# Patient Record
Sex: MALE | Race: WHITE | Employment: OTHER | URBAN - METROPOLITAN AREA
[De-identification: names, ages, dates, MRNs, and addresses within clinical notes are randomized per-mention and may not be internally consistent; named-entity substitution may affect disease eponyms.]

---

## 2017-12-19 ENCOUNTER — OFFICE VISIT (OUTPATIENT)
Dept: FAMILY MEDICINE CLINIC | Facility: CLINIC | Age: 62
End: 2017-12-19

## 2017-12-19 VITALS
BODY MASS INDEX: 29.4 KG/M2 | HEIGHT: 77 IN | WEIGHT: 249 LBS | RESPIRATION RATE: 12 BRPM | DIASTOLIC BLOOD PRESSURE: 58 MMHG | SYSTOLIC BLOOD PRESSURE: 110 MMHG | TEMPERATURE: 98 F | HEART RATE: 56 BPM

## 2017-12-19 DIAGNOSIS — Z13.228 SCREENING FOR ENDOCRINE, METABOLIC AND IMMUNITY DISORDER: ICD-10-CM

## 2017-12-19 DIAGNOSIS — Z13.29 SCREENING FOR ENDOCRINE, METABOLIC AND IMMUNITY DISORDER: ICD-10-CM

## 2017-12-19 DIAGNOSIS — Z13.0 SCREENING FOR ENDOCRINE, METABOLIC AND IMMUNITY DISORDER: ICD-10-CM

## 2017-12-19 DIAGNOSIS — Z00.00 ANNUAL PHYSICAL EXAM: Primary | ICD-10-CM

## 2017-12-19 DIAGNOSIS — Z13.0 SCREENING FOR IRON DEFICIENCY ANEMIA: ICD-10-CM

## 2017-12-19 DIAGNOSIS — Z12.5 SCREENING FOR PROSTATE CANCER: ICD-10-CM

## 2017-12-19 DIAGNOSIS — Z13.220 SCREENING FOR LIPOID DISORDERS: ICD-10-CM

## 2017-12-19 PROCEDURE — 90632 HEPA VACCINE ADULT IM: CPT | Performed by: FAMILY MEDICINE

## 2017-12-19 PROCEDURE — 99396 PREV VISIT EST AGE 40-64: CPT | Performed by: FAMILY MEDICINE

## 2017-12-19 PROCEDURE — 90471 IMMUNIZATION ADMIN: CPT | Performed by: FAMILY MEDICINE

## 2017-12-19 NOTE — PROGRESS NOTES
Jonah Patel is a 65year old male who presents for a complete physical exam.   HPI:   Pt complains of nothing specific. Dad had an AMI in his late 52's. Ultrafast heart scan 5-6 years ago that was \"good\".   Patient has been working on diet and exer stream  MUSCULOSKELETAL: denies back pain  NEURO: denies headaches  PSYCHE: denies depression or anxiety  HEMATOLOGIC: denies hx of anemia  ENDOCRINE: denies thyroid history  ALL/ASTHMA: denies hx of allergy or asthma    EXAM:   /58   Pulse 56   Temp

## 2018-04-25 ENCOUNTER — OFFICE VISIT (OUTPATIENT)
Dept: FAMILY MEDICINE CLINIC | Facility: CLINIC | Age: 63
End: 2018-04-25

## 2018-04-25 ENCOUNTER — HOSPITAL ENCOUNTER (OUTPATIENT)
Dept: GENERAL RADIOLOGY | Age: 63
Discharge: HOME | End: 2018-04-25
Attending: FAMILY MEDICINE
Payer: COMMERCIAL

## 2018-04-25 VITALS
TEMPERATURE: 97 F | DIASTOLIC BLOOD PRESSURE: 68 MMHG | RESPIRATION RATE: 14 BRPM | SYSTOLIC BLOOD PRESSURE: 110 MMHG | HEART RATE: 68 BPM | WEIGHT: 247 LBS | BODY MASS INDEX: 29.16 KG/M2 | HEIGHT: 77 IN

## 2018-04-25 DIAGNOSIS — G89.29 CHRONIC PAIN OF RIGHT KNEE: ICD-10-CM

## 2018-04-25 DIAGNOSIS — Z23 NEED FOR VACCINATION: ICD-10-CM

## 2018-04-25 DIAGNOSIS — M25.561 CHRONIC PAIN OF RIGHT KNEE: Primary | ICD-10-CM

## 2018-04-25 DIAGNOSIS — H00.015 HORDEOLUM EXTERNUM OF LEFT LOWER EYELID: ICD-10-CM

## 2018-04-25 DIAGNOSIS — M25.561 CHRONIC PAIN OF RIGHT KNEE: ICD-10-CM

## 2018-04-25 DIAGNOSIS — G89.29 CHRONIC PAIN OF RIGHT KNEE: Primary | ICD-10-CM

## 2018-04-25 PROCEDURE — 73560 X-RAY EXAM OF KNEE 1 OR 2: CPT | Performed by: FAMILY MEDICINE

## 2018-04-25 PROCEDURE — 99214 OFFICE O/P EST MOD 30 MIN: CPT | Performed by: FAMILY MEDICINE

## 2018-04-25 PROCEDURE — 90750 HZV VACC RECOMBINANT IM: CPT | Performed by: FAMILY MEDICINE

## 2018-04-25 PROCEDURE — 90471 IMMUNIZATION ADMIN: CPT | Performed by: FAMILY MEDICINE

## 2018-04-25 RX ORDER — ERYTHROMYCIN 5 MG/G
OINTMENT OPHTHALMIC
Qty: 1 TUBE | Refills: 0 | Status: SHIPPED | OUTPATIENT
Start: 2018-04-25 | End: 2020-01-28

## 2018-04-25 NOTE — PROGRESS NOTES
[facility] Office Note  Chief Complaint:   Patient presents with:  Pain: pain right knee, injury in Jan 2018      HPI:   This is a 59year old male coming in for right knee pain and stye of the left lower eyelid.     1.  Knee pain sclerae, + stye on left lower eyelid. Ears, Nose, Throat:  Denies hearing loss, sneezing, congestion, runny nose or sore throat. CARDIOVASCULAR:  Denies chest pain, chest pressure or chest discomfort. No palpitations or edema.   Denies any dyspnea on exert Ice and elevation  -  If no improvement with PT, will obtain MRI and refer to ortho  - XR KNEE (1 OR 2 VIEWS), RIGHT (CPT=73560); Future  - OP REFERRAL TO [facility]    2.  Need for vaccination  -  shingrix given today  -  2nd dose in 2

## 2018-07-31 ENCOUNTER — NURSE ONLY (OUTPATIENT)
Dept: FAMILY MEDICINE CLINIC | Facility: CLINIC | Age: 63
End: 2018-07-31
Payer: COMMERCIAL

## 2018-07-31 DIAGNOSIS — Z23 NEED FOR VACCINATION: Primary | ICD-10-CM

## 2018-07-31 PROCEDURE — 90471 IMMUNIZATION ADMIN: CPT | Performed by: FAMILY MEDICINE

## 2018-07-31 PROCEDURE — 90750 HZV VACC RECOMBINANT IM: CPT | Performed by: FAMILY MEDICINE

## 2018-10-18 ENCOUNTER — IMMUNIZATION (OUTPATIENT)
Dept: FAMILY MEDICINE CLINIC | Facility: CLINIC | Age: 63
End: 2018-10-18
Payer: COMMERCIAL

## 2018-10-18 DIAGNOSIS — Z23 NEED FOR VACCINATION: ICD-10-CM

## 2018-10-18 PROCEDURE — 90471 IMMUNIZATION ADMIN: CPT | Performed by: FAMILY MEDICINE

## 2018-10-18 PROCEDURE — 90686 IIV4 VACC NO PRSV 0.5 ML IM: CPT | Performed by: FAMILY MEDICINE

## 2019-09-03 ENCOUNTER — OFFICE VISIT (OUTPATIENT)
Dept: FAMILY MEDICINE CLINIC | Facility: CLINIC | Age: 64
End: 2019-09-03
Payer: COMMERCIAL

## 2019-09-03 VITALS
HEIGHT: 76.5 IN | WEIGHT: 261 LBS | DIASTOLIC BLOOD PRESSURE: 74 MMHG | SYSTOLIC BLOOD PRESSURE: 116 MMHG | BODY MASS INDEX: 31.46 KG/M2 | HEART RATE: 56 BPM | TEMPERATURE: 98 F | RESPIRATION RATE: 12 BRPM

## 2019-09-03 DIAGNOSIS — Z82.49 FAMILY HISTORY OF HEART DISEASE IN MALE FAMILY MEMBER BEFORE AGE 55: ICD-10-CM

## 2019-09-03 DIAGNOSIS — Z00.00 ANNUAL PHYSICAL EXAM: Primary | ICD-10-CM

## 2019-09-03 DIAGNOSIS — Z13.220 SCREENING FOR LIPOID DISORDERS: ICD-10-CM

## 2019-09-03 DIAGNOSIS — Z13.29 SCREENING FOR ENDOCRINE, METABOLIC AND IMMUNITY DISORDER: ICD-10-CM

## 2019-09-03 DIAGNOSIS — Z13.0 SCREENING FOR IRON DEFICIENCY ANEMIA: ICD-10-CM

## 2019-09-03 DIAGNOSIS — Z13.228 SCREENING FOR ENDOCRINE, METABOLIC AND IMMUNITY DISORDER: ICD-10-CM

## 2019-09-03 DIAGNOSIS — Z13.0 SCREENING FOR ENDOCRINE, METABOLIC AND IMMUNITY DISORDER: ICD-10-CM

## 2019-09-03 DIAGNOSIS — Z13.89 SCREENING FOR GENITOURINARY CONDITION: ICD-10-CM

## 2019-09-03 DIAGNOSIS — Z12.5 SCREENING FOR PROSTATE CANCER: ICD-10-CM

## 2019-09-03 LAB
APPEARANCE: CLEAR
MULTISTIX LOT#: NORMAL NUMERIC
PH, URINE: 5 (ref 4.5–8)
SPECIFIC GRAVITY: 1.02 (ref 1–1.03)
URINE-COLOR: YELLOW
UROBILINOGEN,SEMI-QN: 0.2 MG/DL (ref 0–1.9)

## 2019-09-03 PROCEDURE — 81003 URINALYSIS AUTO W/O SCOPE: CPT | Performed by: FAMILY MEDICINE

## 2019-09-03 PROCEDURE — 99396 PREV VISIT EST AGE 40-64: CPT | Performed by: FAMILY MEDICINE

## 2019-09-03 NOTE — PROGRESS NOTES
George Narayan is a 62year old male who presents for a complete physical exam.   HPI:   Pt complains of nothing specific. Dad had an AMI in his late 52's. Ultrafast heart scan 5-6 years ago that was \"good\". Will have colonoscopy in 1/20.     Wt Deep [place] denies depression or anxiety  HEMATOLOGIC: denies hx of anemia  ENDOCRINE: denies thyroid history  ALL/ASTHMA: denies hx of allergy or asthma    EXAM:   /74   Pulse 56   Temp 98 °F (36.7 °C)   Resp 12   Ht 76.5\"   Wt 261 lb   BMI 31.36 kg/m²   Body

## 2019-09-04 ENCOUNTER — MED REC SCAN ONLY (OUTPATIENT)
Dept: FAMILY MEDICINE CLINIC | Facility: CLINIC | Age: 64
End: 2019-09-04

## 2019-10-15 ENCOUNTER — HOSPITAL ENCOUNTER (OUTPATIENT)
Dept: CT IMAGING | Facility: HOSPITAL | Age: 64
Discharge: HOME | End: 2019-10-15
Attending: FAMILY MEDICINE

## 2019-10-15 DIAGNOSIS — Z13.6 SCREENING FOR CARDIOVASCULAR CONDITION: ICD-10-CM

## 2019-10-25 ENCOUNTER — TELEPHONE (OUTPATIENT)
Dept: FAMILY MEDICINE CLINIC | Facility: CLINIC | Age: 64
End: 2019-10-25

## 2019-10-25 DIAGNOSIS — I77.810 DILATATION OF THORACIC AORTA (HCC): Primary | ICD-10-CM

## 2019-10-25 NOTE — TELEPHONE ENCOUNTER
LM to Cb. Please inform pt he had an UFHS with an incidental finding of a dilated thoracic aorta of 4.0-4.1 cm. Dr. Ty López would like him to have a gated CT angiogram of the thoracic aorta for further evaluation. Order not placed until pt is notified.

## 2019-10-25 NOTE — TELEPHONE ENCOUNTER
Pt informed of [facility] result done 10/15/2019 and the recommendation. Pt voiced understanding, but request to talk Dr. Pepe Sommer. Informed pt I will send request to Dr. Pepe Sommer, but may not be able to call him back today because he is seeing patients.

## 2019-10-25 NOTE — TELEPHONE ENCOUNTER
I spoke with pt. He is aware he can wait and do his gated CTA of the thoracic aorta in January per Dr. Govea Artist. Pt agreed to plan and verbalized understanding.

## 2019-10-31 ENCOUNTER — IMMUNIZATION (OUTPATIENT)
Dept: FAMILY MEDICINE CLINIC | Facility: CLINIC | Age: 64
End: 2019-10-31
Payer: COMMERCIAL

## 2019-10-31 DIAGNOSIS — Z23 NEED FOR VACCINATION: ICD-10-CM

## 2019-10-31 PROCEDURE — 90471 IMMUNIZATION ADMIN: CPT | Performed by: FAMILY MEDICINE

## 2019-10-31 PROCEDURE — 90686 IIV4 VACC NO PRSV 0.5 ML IM: CPT | Performed by: FAMILY MEDICINE

## 2019-11-21 ENCOUNTER — TELEPHONE (OUTPATIENT)
Dept: FAMILY MEDICINE CLINIC | Facility: CLINIC | Age: 64
End: 2019-11-21

## 2019-11-26 ENCOUNTER — MED REC SCAN ONLY (OUTPATIENT)
Dept: FAMILY MEDICINE CLINIC | Facility: CLINIC | Age: 64
End: 2019-11-26

## 2020-01-29 PROBLEM — Z12.11 SPECIAL SCREENING FOR MALIGNANT NEOPLASM OF COLON: Status: ACTIVE | Noted: 2020-01-29

## 2020-01-31 ENCOUNTER — HOSPITAL ENCOUNTER (OUTPATIENT)
Dept: CT IMAGING | Facility: HOSPITAL | Age: 65
Discharge: HOME | End: 2020-01-31
Attending: FAMILY MEDICINE
Payer: COMMERCIAL

## 2020-01-31 VITALS — SYSTOLIC BLOOD PRESSURE: 129 MMHG | DIASTOLIC BLOOD PRESSURE: 80 MMHG | RESPIRATION RATE: 16 BRPM | HEART RATE: 57 BPM

## 2020-01-31 DIAGNOSIS — I77.810 DILATATION OF THORACIC AORTA (HCC): ICD-10-CM

## 2020-01-31 LAB — CREAT BLD-MCNC: 1.1 MG/DL (ref 0.7–1.3)

## 2020-01-31 PROCEDURE — 82565 ASSAY OF CREATININE: CPT

## 2020-01-31 PROCEDURE — 71275 CT ANGIOGRAPHY CHEST: CPT | Performed by: FAMILY MEDICINE

## 2020-01-31 NOTE — IMAGING NOTE
Explained Gated  CTA  thoracic aorta procedure. Patient asymptomatic, denies CP distress. Patient kept comfortable in CT table. Noted SB with infrequent PVC'c in lead II on heart monitor. Patient tolerated procedure well. Average heart rate = 50 .   Denies

## 2021-04-29 ENCOUNTER — MED REC SCAN ONLY (OUTPATIENT)
Dept: FAMILY MEDICINE CLINIC | Facility: CLINIC | Age: 66
End: 2021-04-29

## 2021-04-29 ENCOUNTER — OFFICE VISIT (OUTPATIENT)
Dept: FAMILY MEDICINE CLINIC | Facility: CLINIC | Age: 66
End: 2021-04-29
Payer: MEDICARE

## 2021-04-29 VITALS
RESPIRATION RATE: 16 BRPM | HEIGHT: 77 IN | DIASTOLIC BLOOD PRESSURE: 70 MMHG | BODY MASS INDEX: 31.41 KG/M2 | SYSTOLIC BLOOD PRESSURE: 120 MMHG | HEART RATE: 60 BPM | TEMPERATURE: 99 F | WEIGHT: 266 LBS

## 2021-04-29 DIAGNOSIS — Z00.00 ENCOUNTER FOR ANNUAL HEALTH EXAMINATION: Primary | ICD-10-CM

## 2021-04-29 DIAGNOSIS — Z13.89 SCREENING FOR GENITOURINARY CONDITION: ICD-10-CM

## 2021-04-29 DIAGNOSIS — Z23 NEED FOR VACCINATION: ICD-10-CM

## 2021-04-29 DIAGNOSIS — Z13.0 SCREENING FOR ENDOCRINE, METABOLIC AND IMMUNITY DISORDER: ICD-10-CM

## 2021-04-29 DIAGNOSIS — Z13.228 SCREENING FOR ENDOCRINE, METABOLIC AND IMMUNITY DISORDER: ICD-10-CM

## 2021-04-29 DIAGNOSIS — Z13.220 SCREENING FOR LIPOID DISORDERS: ICD-10-CM

## 2021-04-29 DIAGNOSIS — Z13.29 SCREENING FOR ENDOCRINE, METABOLIC AND IMMUNITY DISORDER: ICD-10-CM

## 2021-04-29 DIAGNOSIS — Z11.59 NEED FOR HEPATITIS C SCREENING TEST: ICD-10-CM

## 2021-04-29 DIAGNOSIS — Z12.5 SCREENING FOR PROSTATE CANCER: ICD-10-CM

## 2021-04-29 DIAGNOSIS — Z13.0 SCREENING FOR IRON DEFICIENCY ANEMIA: ICD-10-CM

## 2021-04-29 PROCEDURE — 99397 PER PM REEVAL EST PAT 65+ YR: CPT | Performed by: FAMILY MEDICINE

## 2021-04-29 PROCEDURE — 3074F SYST BP LT 130 MM HG: CPT | Performed by: FAMILY MEDICINE

## 2021-04-29 PROCEDURE — 3078F DIAST BP <80 MM HG: CPT | Performed by: FAMILY MEDICINE

## 2021-04-29 PROCEDURE — G0402 INITIAL PREVENTIVE EXAM: HCPCS | Performed by: FAMILY MEDICINE

## 2021-04-29 PROCEDURE — 96160 PT-FOCUSED HLTH RISK ASSMT: CPT | Performed by: FAMILY MEDICINE

## 2021-04-29 PROCEDURE — 3008F BODY MASS INDEX DOCD: CPT | Performed by: FAMILY MEDICINE

## 2021-04-29 NOTE — PATIENT INSTRUCTIONS
Dhaval Leon's SCREENING SCHEDULE   Tests on this list are recommended by your physician but may not be covered, or covered at this frequency, by your insurer. Please check with your insurance carrier before scheduling to verify coverage.     PREVENT abnormal Colonoscopy due on 01/29/2030 Update [facility] if applicable    Flex Sigmoidoscopy Screen  Covered every 5 years No results found for this or any previous visit. No flowsheet data found.      Fecal Occult Blood   Covered Annually No result >7 YEARS, IM USE    This may be covered with your prescription benefits, but Medicare does not cover unless Medically needed    Zoster (Not covered by Medicare Part B) No orders found for this or any previous visit.  This may be covered with your pharmacy Medicare covers annually or at 6-month intervals for prediabetic patients        Cardiovascular Disease Screening     Cholesterol, covered every 5 yrs including Total, LDL and Trigs LDL-CHOLESTEROL (mg/dL (calc))   Date Value   10/09/2019 78     SHANNAN Influenza  Covered Annually No orders found for this or any previous visit. Please get every year    Pneumococcal 13 (Prevnar)  Covered Once after 65 No orders found for this or any previous visit.  Please get once after your 65th birthday    Pneumococcal 2 Association regarding Advance Directives.

## 2021-04-29 NOTE — PROGRESS NOTES
HPI:   Jonah Patel is a 73year old male who presents for a MA (Medicare Advantage) Supervisit (Once per calendar year). Pt complains of nothing specific. Dad had an AMI in his late 52's.   Patient had an ultrafast heart scan on 10/15/2019 with a 0 physical exam     Special screening for malignant neoplasm of colon    Wt Readings from Last 3 Encounters:  04/29/21 : 266 lb (120.7 kg)  01/28/20 : 260 lb (117.9 kg)  09/03/19 : 261 lb (118.4 kg)     Last Cholesterol Labs:   Lab Results   Component Value 60   Temp 98.7 °F (37.1 °C)   Resp 16   Ht 6' 5\" (1.956 m)   Wt 266 lb (120.7 kg)   BMI 31.54 kg/m²   Estimated body mass index is 31.54 kg/m² as calculated from the following:    Height as of this encounter: 6' 5\" (1.956 m).     Weight as of this encount masses or tenderness   Extremities: Extremities normal, atraumatic, no cyanosis or edema   Pulses: 2+ and symmetric   Skin: Skin color, texture, turgor normal, no rashes or lesions   Lymph nodes: Cervical, supraclavicular, and axillary nodes normal   Neuro appetite been poor?: No  How does the patient maintain a good energy level?: Daily Walks  How would you describe your daily physical activity?: Light  How would you describe your current health state?: Good  How do you maintain positive mental well-being?: Factor VIII or IX concentrates   Clients of institutions for the mentally retarded   Persons who live in the same house as a HepB virus carrier   Homosexual men   Illicit injectable drug abusers     Tetanus Toxoid  Only covered with a cut with metal- TD an

## 2021-05-21 ENCOUNTER — NURSE ONLY (OUTPATIENT)
Dept: FAMILY MEDICINE CLINIC | Facility: CLINIC | Age: 66
End: 2021-05-21
Payer: MEDICARE

## 2021-05-21 PROCEDURE — G0009 ADMIN PNEUMOCOCCAL VACCINE: HCPCS | Performed by: FAMILY MEDICINE

## 2021-05-21 PROCEDURE — 90732 PPSV23 VACC 2 YRS+ SUBQ/IM: CPT | Performed by: FAMILY MEDICINE

## 2021-10-18 ENCOUNTER — PATIENT MESSAGE (OUTPATIENT)
Dept: FAMILY MEDICINE CLINIC | Facility: CLINIC | Age: 66
End: 2021-10-18

## 2021-10-19 NOTE — TELEPHONE ENCOUNTER
From: Micah Christine  To: Bobbi De MD  Sent: 10/18/2021 10:51 AM CDT  Subject: Jeff Wray Booster Shot    Please have entered into [employer] that I had my Bomgar Third Shot Booster done on 10/18/2021.

## 2022-05-05 ENCOUNTER — OFFICE VISIT (OUTPATIENT)
Dept: FAMILY MEDICINE CLINIC | Facility: CLINIC | Age: 67
End: 2022-05-05
Payer: MEDICARE

## 2022-05-05 VITALS
DIASTOLIC BLOOD PRESSURE: 84 MMHG | HEART RATE: 56 BPM | RESPIRATION RATE: 16 BRPM | TEMPERATURE: 98 F | HEIGHT: 76.5 IN | SYSTOLIC BLOOD PRESSURE: 130 MMHG | BODY MASS INDEX: 31.94 KG/M2 | WEIGHT: 265 LBS

## 2022-05-05 DIAGNOSIS — N40.0 BENIGN PROSTATIC HYPERPLASIA WITHOUT LOWER URINARY TRACT SYMPTOMS: ICD-10-CM

## 2022-05-05 DIAGNOSIS — Z13.0 SCREENING FOR IRON DEFICIENCY ANEMIA: ICD-10-CM

## 2022-05-05 DIAGNOSIS — Z23 NEED FOR VACCINATION: ICD-10-CM

## 2022-05-05 DIAGNOSIS — Z13.29 SCREENING FOR ENDOCRINE, METABOLIC AND IMMUNITY DISORDER: ICD-10-CM

## 2022-05-05 DIAGNOSIS — Z13.89 SCREENING FOR GENITOURINARY CONDITION: ICD-10-CM

## 2022-05-05 DIAGNOSIS — Z13.228 SCREENING FOR ENDOCRINE, METABOLIC AND IMMUNITY DISORDER: ICD-10-CM

## 2022-05-05 DIAGNOSIS — Z00.00 ENCOUNTER FOR ANNUAL HEALTH EXAMINATION: Primary | ICD-10-CM

## 2022-05-05 DIAGNOSIS — Z13.220 SCREENING FOR LIPOID DISORDERS: ICD-10-CM

## 2022-05-05 DIAGNOSIS — Z13.0 SCREENING FOR ENDOCRINE, METABOLIC AND IMMUNITY DISORDER: ICD-10-CM

## 2022-05-05 PROCEDURE — 3079F DIAST BP 80-89 MM HG: CPT | Performed by: FAMILY MEDICINE

## 2022-05-05 PROCEDURE — 96160 PT-FOCUSED HLTH RISK ASSMT: CPT | Performed by: FAMILY MEDICINE

## 2022-05-05 PROCEDURE — G0439 PPPS, SUBSEQ VISIT: HCPCS | Performed by: FAMILY MEDICINE

## 2022-05-05 PROCEDURE — G0009 ADMIN PNEUMOCOCCAL VACCINE: HCPCS | Performed by: FAMILY MEDICINE

## 2022-05-05 PROCEDURE — 3075F SYST BP GE 130 - 139MM HG: CPT | Performed by: FAMILY MEDICINE

## 2022-05-05 PROCEDURE — 99397 PER PM REEVAL EST PAT 65+ YR: CPT | Performed by: FAMILY MEDICINE

## 2022-05-05 PROCEDURE — 3008F BODY MASS INDEX DOCD: CPT | Performed by: FAMILY MEDICINE

## 2022-05-05 PROCEDURE — 90677 PCV20 VACCINE IM: CPT | Performed by: FAMILY MEDICINE

## 2022-05-10 LAB
ABSOLUTE BASOPHILS: 59 CELLS/UL (ref 0–200)
ABSOLUTE EOSINOPHILS: 99 CELLS/UL (ref 15–500)
ABSOLUTE LYMPHOCYTES: 1710 CELLS/UL (ref 850–3900)
ABSOLUTE MONOCYTES: 558 CELLS/UL (ref 200–950)
ABSOLUTE NEUTROPHILS: 2075 CELLS/UL (ref 1500–7800)
ALBUMIN/GLOBULIN RATIO: 1.6 (CALC) (ref 1–2.5)
ALBUMIN: 3.9 G/DL (ref 3.6–5.1)
ALKALINE PHOSPHATASE: 60 U/L (ref 35–144)
ALT: 17 U/L (ref 9–46)
APPEARANCE: CLEAR
AST: 16 U/L (ref 10–35)
BASOPHILS: 1.3 %
BILIRUBIN, TOTAL: 0.4 MG/DL (ref 0.2–1.2)
BILIRUBIN: NEGATIVE
BUN: 19 MG/DL (ref 7–25)
CALCIUM: 8.9 MG/DL (ref 8.6–10.3)
CARBON DIOXIDE: 26 MMOL/L (ref 20–32)
CHLORIDE: 106 MMOL/L (ref 98–110)
CHOL/HDLC RATIO: 2.6 (CALC)
CHOLESTEROL, TOTAL: 140 MG/DL
COLOR: YELLOW
CREATININE: 1.16 MG/DL (ref 0.7–1.25)
EGFR IF AFRICN AM: 76 ML/MIN/1.73M2
EGFR IF NONAFRICN AM: 65 ML/MIN/1.73M2
EOSINOPHILS: 2.2 %
GLOBULIN: 2.4 G/DL (CALC) (ref 1.9–3.7)
GLUCOSE: 95 MG/DL (ref 65–99)
GLUCOSE: NEGATIVE
HDL CHOLESTEROL: 53 MG/DL
HEMATOCRIT: 43.5 % (ref 38.5–50)
HEMOGLOBIN: 14.4 G/DL (ref 13.2–17.1)
KETONES: NEGATIVE
LDL-CHOLESTEROL: 69 MG/DL (CALC)
LEUKOCYTE ESTERASE: NEGATIVE
LYMPHOCYTES: 38 %
MCH: 30.3 PG (ref 27–33)
MCHC: 33.1 G/DL (ref 32–36)
MCV: 91.6 FL (ref 80–100)
MONOCYTES: 12.4 %
MPV: 10.4 FL (ref 7.5–12.5)
NEUTROPHILS: 46.1 %
NITRITE: NEGATIVE
NON-HDL CHOLESTEROL: 87 MG/DL (CALC)
OCCULT BLOOD: NEGATIVE
PLATELET COUNT: 208 THOUSAND/UL (ref 140–400)
POTASSIUM: 4.2 MMOL/L (ref 3.5–5.3)
PROTEIN, TOTAL: 6.3 G/DL (ref 6.1–8.1)
PROTEIN: NEGATIVE
PSA, TOTAL: 0.58 NG/ML
RDW: 14.6 % (ref 11–15)
RED BLOOD CELL COUNT: 4.75 MILLION/UL (ref 4.2–5.8)
SODIUM: 139 MMOL/L (ref 135–146)
SPECIFIC GRAVITY: 1.02 (ref 1–1.03)
TRIGLYCERIDES: 93 MG/DL
WHITE BLOOD CELL COUNT: 4.5 THOUSAND/UL (ref 3.8–10.8)

## 2022-07-18 ENCOUNTER — TELEPHONE (OUTPATIENT)
Dept: FAMILY MEDICINE CLINIC | Facility: CLINIC | Age: 67
End: 2022-07-18

## 2022-07-18 DIAGNOSIS — Z12.11 ENCOUNTER FOR SCREENING FECAL OCCULT BLOOD TESTING: Primary | ICD-10-CM

## 2022-07-18 NOTE — TELEPHONE ENCOUNTER
Pt called for order fecal occult lab tests. Pt states that his insurance company would pay for it. Pt is ok to receive a [employer] message when order is placed.

## 2022-07-26 ENCOUNTER — MED REC SCAN ONLY (OUTPATIENT)
Dept: FAMILY MEDICINE CLINIC | Facility: CLINIC | Age: 67
End: 2022-07-26

## 2022-08-08 ENCOUNTER — LAB ENCOUNTER (OUTPATIENT)
Dept: LAB | Age: 67
End: 2022-08-08
Attending: FAMILY MEDICINE
Payer: COMMERCIAL

## 2022-08-08 LAB — HEMOCCULT STL QL: NEGATIVE

## 2022-08-08 PROCEDURE — 82274 ASSAY TEST FOR BLOOD FECAL: CPT | Performed by: FAMILY MEDICINE

## 2023-08-23 ENCOUNTER — OFFICE VISIT (OUTPATIENT)
Dept: FAMILY MEDICINE CLINIC | Facility: CLINIC | Age: 68
End: 2023-08-23
Payer: MEDICARE

## 2023-08-23 VITALS
HEIGHT: 76.5 IN | RESPIRATION RATE: 16 BRPM | OXYGEN SATURATION: 94 % | DIASTOLIC BLOOD PRESSURE: 50 MMHG | WEIGHT: 250 LBS | HEART RATE: 54 BPM | SYSTOLIC BLOOD PRESSURE: 92 MMHG | BODY MASS INDEX: 30.13 KG/M2 | TEMPERATURE: 97 F

## 2023-08-23 DIAGNOSIS — Z13.220 SCREENING FOR LIPOID DISORDERS: ICD-10-CM

## 2023-08-23 DIAGNOSIS — Z13.29 SCREENING FOR ENDOCRINE, METABOLIC AND IMMUNITY DISORDER: ICD-10-CM

## 2023-08-23 DIAGNOSIS — Z00.00 ENCOUNTER FOR ANNUAL HEALTH EXAMINATION: Primary | ICD-10-CM

## 2023-08-23 DIAGNOSIS — Z13.0 SCREENING FOR IRON DEFICIENCY ANEMIA: ICD-10-CM

## 2023-08-23 DIAGNOSIS — Z13.0 SCREENING FOR ENDOCRINE, METABOLIC AND IMMUNITY DISORDER: ICD-10-CM

## 2023-08-23 DIAGNOSIS — N40.0 BENIGN PROSTATIC HYPERPLASIA WITHOUT LOWER URINARY TRACT SYMPTOMS: ICD-10-CM

## 2023-08-23 DIAGNOSIS — Z12.5 SCREENING FOR PROSTATE CANCER: ICD-10-CM

## 2023-08-23 DIAGNOSIS — Z13.228 SCREENING FOR ENDOCRINE, METABOLIC AND IMMUNITY DISORDER: ICD-10-CM

## 2023-08-25 LAB
ABSOLUTE BASOPHILS: 40 CELLS/UL (ref 0–200)
ABSOLUTE EOSINOPHILS: 70 CELLS/UL (ref 15–500)
ABSOLUTE LYMPHOCYTES: 1562 CELLS/UL (ref 850–3900)
ABSOLUTE MONOCYTES: 550 CELLS/UL (ref 200–950)
ABSOLUTE NEUTROPHILS: 2178 CELLS/UL (ref 1500–7800)
ALBUMIN/GLOBULIN RATIO: 1.5 (CALC) (ref 1–2.5)
ALBUMIN: 4 G/DL (ref 3.6–5.1)
ALKALINE PHOSPHATASE: 59 U/L (ref 35–144)
ALT: 13 U/L (ref 9–46)
AST: 13 U/L (ref 10–35)
BASOPHILS: 0.9 %
BILIRUBIN, TOTAL: 0.4 MG/DL (ref 0.2–1.2)
BUN: 19 MG/DL (ref 7–25)
CALCIUM: 9.2 MG/DL (ref 8.6–10.3)
CARBON DIOXIDE: 26 MMOL/L (ref 20–32)
CHLORIDE: 107 MMOL/L (ref 98–110)
CHOL/HDLC RATIO: 2.7 (CALC)
CHOLESTEROL, TOTAL: 145 MG/DL
CREATININE: 1.2 MG/DL (ref 0.7–1.35)
EGFR: 66 ML/MIN/1.73M2
EOSINOPHILS: 1.6 %
GLOBULIN: 2.6 G/DL (CALC) (ref 1.9–3.7)
GLUCOSE: 92 MG/DL (ref 65–99)
HDL CHOLESTEROL: 53 MG/DL
HEMATOCRIT: 47.1 % (ref 38.5–50)
HEMOGLOBIN: 15 G/DL (ref 13.2–17.1)
LDL-CHOLESTEROL: 74 MG/DL (CALC)
LYMPHOCYTES: 35.5 %
MCH: 30.9 PG (ref 27–33)
MCHC: 31.8 G/DL (ref 32–36)
MCV: 96.9 FL (ref 80–100)
MONOCYTES: 12.5 %
MPV: 11.1 FL (ref 7.5–12.5)
NEUTROPHILS: 49.5 %
NON-HDL CHOLESTEROL: 92 MG/DL (CALC)
PLATELET COUNT: 184 THOUSAND/UL (ref 140–400)
POTASSIUM: 4.2 MMOL/L (ref 3.5–5.3)
PROTEIN, TOTAL: 6.6 G/DL (ref 6.1–8.1)
PSA, TOTAL: 0.42 NG/ML
RDW: 13.5 % (ref 11–15)
RED BLOOD CELL COUNT: 4.86 MILLION/UL (ref 4.2–5.8)
SODIUM: 141 MMOL/L (ref 135–146)
TRIGLYCERIDES: 97 MG/DL
TSH W/REFLEX TO FT4: 1.95 MIU/L (ref 0.4–4.5)
WHITE BLOOD CELL COUNT: 4.4 THOUSAND/UL (ref 3.8–10.8)

## 2023-09-26 ENCOUNTER — PATIENT MESSAGE (OUTPATIENT)
Dept: FAMILY MEDICINE CLINIC | Facility: CLINIC | Age: 68
End: 2023-09-26

## 2023-09-26 NOTE — TELEPHONE ENCOUNTER
From: Sirisha How  To: Darylene CardLisseth Buck Riddles: 9/26/2023 4:21 PM CDT  Subject: Add (2) Vaccinations to My Chart    Can you please have someone add the attached vaccinations I had on 9/19/23 to My Chart. They are the updated Pfzier Covid vaccination and the High Dose Flu vaccination.     Thanks,    Nimisha Morgan  [phone] (mobile)

## 2024-07-29 ENCOUNTER — PATIENT MESSAGE (OUTPATIENT)
Dept: FAMILY MEDICINE CLINIC | Facility: CLINIC | Age: 69
End: 2024-07-29

## 2024-07-29 NOTE — TELEPHONE ENCOUNTER
From: Joey Leon  To: Carter Bowling  Sent: 7/29/2024 2:59 PM CDT  Subject: Please add a vaccination to My Chart    On 6/25/2024, I received the Pfizer high dose Covid immunization at [address]. Please add this to My Chart. Thanks.

## 2024-09-05 ENCOUNTER — OFFICE VISIT (OUTPATIENT)
Dept: FAMILY MEDICINE CLINIC | Facility: CLINIC | Age: 69
End: 2024-09-05
Payer: MEDICARE

## 2024-09-05 VITALS
TEMPERATURE: 98 F | HEART RATE: 63 BPM | DIASTOLIC BLOOD PRESSURE: 64 MMHG | WEIGHT: 250 LBS | BODY MASS INDEX: 30.13 KG/M2 | RESPIRATION RATE: 16 BRPM | HEIGHT: 76.5 IN | SYSTOLIC BLOOD PRESSURE: 118 MMHG

## 2024-09-05 DIAGNOSIS — Z13.0 SCREENING FOR IRON DEFICIENCY ANEMIA: ICD-10-CM

## 2024-09-05 DIAGNOSIS — Z00.00 MEDICARE ANNUAL WELLNESS VISIT, SUBSEQUENT: ICD-10-CM

## 2024-09-05 DIAGNOSIS — Z13.0 SCREENING FOR ENDOCRINE, METABOLIC AND IMMUNITY DISORDER: ICD-10-CM

## 2024-09-05 DIAGNOSIS — Z13.228 SCREENING FOR ENDOCRINE, METABOLIC AND IMMUNITY DISORDER: ICD-10-CM

## 2024-09-05 DIAGNOSIS — Z13.29 SCREENING FOR ENDOCRINE, METABOLIC AND IMMUNITY DISORDER: ICD-10-CM

## 2024-09-05 DIAGNOSIS — N40.0 BENIGN PROSTATIC HYPERPLASIA WITHOUT LOWER URINARY TRACT SYMPTOMS: ICD-10-CM

## 2024-09-05 DIAGNOSIS — Z00.00 ENCOUNTER FOR ANNUAL HEALTH EXAMINATION: ICD-10-CM

## 2024-09-05 DIAGNOSIS — Z13.220 SCREENING FOR LIPOID DISORDERS: ICD-10-CM

## 2024-09-05 DIAGNOSIS — Z12.5 SCREENING FOR PROSTATE CANCER: ICD-10-CM

## 2024-09-05 PROCEDURE — G0439 PPPS, SUBSEQ VISIT: HCPCS | Performed by: FAMILY MEDICINE

## 2024-09-05 PROCEDURE — 96160 PT-FOCUSED HLTH RISK ASSMT: CPT | Performed by: FAMILY MEDICINE

## 2024-09-05 NOTE — PROGRESS NOTES
Subjective:   Joey Leon is a 69 year old male who presents for a MA AHA (Medicare Advantage Annual Health Assessment) and Medicare Subsequent Annual Wellness visit (Pt already had Initial Annual Wellness) and good health with no chronic problems.   Patient denies any acute concerns today states that he has been doing well and denies any chest pain nausea vomiting diarrhea constipation.    History/Other:   Fall Risk Assessment:   He has been screened for Falls and is low risk.      Cognitive Assessment:   He had a completely normal cognitive assessment - see flowsheet entries     Functional Ability/Status:   Joey Leon has a completely normal functional assessment. See flowsheet for details.      Depression Screening (PHQ):  PHQ-2 SCORE: 0  , done 9/5/2024          Advanced Directives:   He does have a Living Will but we do NOT have it on file in Epic.    He has a Power of  for Health Care on file in [email or web address].  Patient has Advance Care Planning documents present in EMR. Reviewed documents with patient (and family/surrogate if present).      Patient Active Problem List   Diagnosis    Dizziness and giddiness    Screening for iron deficiency anemia    Screening for endocrine, metabolic and immunity disorder    Screening for lipoid disorders    Screening for prostate cancer    Annual physical exam    Special screening for malignant neoplasm of colon     Allergies:  He has No Known Allergies.    Current Medications:  No outpatient medications have been marked as taking for the 9/5/24 encounter (Office Visit) with Carter Bowling MD.       Medical History:  He  has a past medical history of Vomiting (1/8/2020) and Wears glasses (Since 1969).  Surgical History:  He  has a past surgical history that includes colonoscopy (1/13/2009).   Family History:  His family history includes Heart Attack in his father.  Social History:  He  reports that he has never smoked. He has never used smokeless tobacco. He  reports current alcohol use. He reports that he does not use drugs.    Tobacco:  He has never smoked tobacco.    CAGE Alcohol Screen:   CAGE screening score of 0 on 8/29/2024, showing low risk of alcohol abuse.      Patient Care Team:  Jill Smith MD as PCP - General    Review of Systems  Consitutional: No fevers, chills, sweats  Eye: No recent visual problems  ENMT: No ear pain nasal congestion sore throat  Respiratory: No shortness of breath, cough  Cardiovascular: No chest pain palpitations syncope  Gastrointestinal: No nausea vomiting diarrhea  Genitourinary: No hematuria  Hema/Lymph no bruising tendency, swollen lymph glands  Endocrine: Negative for excessive thirst excessive hunger  Musculoskeletal: No back pain neck pain joint pain muscle pain decreased range of motion  Integumentary: No rash, pruritus, abrasions  Neurologic: Alert and oriented x4  Psychiatric: No anxiety, depression    Medical, surgical, family, and social histories were reviewed      Objective:   Physical Exam    VITALS: Vitals reviewed   GENERAL: well developed, well nourished, in no apparent distress  SKIN: no rashes, no suspicious lesions: Cool and Dry  HEENT: atraumatic, normocephalic, ears and throat are clear bilateral tympanic membranes lucent nonbulging intact nose without bleeding purulent discharge or septal hematoma.    EYES: Pupils equal round and reactive.  Extraocular motions intact no scleral icterus no injection or drainage  THROAT without erythema tonsillar hypertrophy or exudate.  Uvula midline airway patent                Hearing Assessed via: Whispered Voice  NECK: trachea midline.  No JVD or lymphadenopathy supple nontender no meningeal signs   LUNGS: clear to auscultation sounds equal bilaterally no wheezes rales or rhonchi  CARDIO: Regular rate and rhythm without murmurs gallops or rubs  GI: Soft nontender nondistended no hepatomegaly palpable masses.  No guarding.   without deformity or crepitus  no flank tenderness  EXTREMITIES: no cyanosis, clubbing or edema no joint tenderness effusion or edema noted.  No calf tenderness negative Homans' sign bilaterally  NEURO: Awake and alert.  Cranial nerves II through XII intact motor and sensory grossly within normal limits.  5 out of 5 muscle strength in all muscle groups.  Normal speech.  PSYCHIATRIC: Awake, alert and oriented x3, cooperative appropriate mood and affect.  Judgment intact        /64 (BP Location: Left arm, Patient Position: Sitting, Cuff Size: adult)   Pulse 63   Temp 97.5 °F (36.4 °C) (Temporal)   Resp 16   Ht 6' 4.5\" (1.943 m)   Wt 250 lb (113.4 kg)   BMI 30.03 kg/m²  Estimated body mass index is 30.03 kg/m² as calculated from the following:    Height as of this encounter: 6' 4.5\" (1.943 m).    Weight as of this encounter: 250 lb (113.4 kg).    Medicare Hearing Assessment:   Hearing Screening    Time taken: 9/5/2024  7:38 AM  Entry User: Jean-Claude Donnelly MA  Screening Method: Finger Rub  Finger Rub Result: Pass         Visual Acuity:   Right Eye Visual Acuity: Corrected Right Eye Chart Acuity: 20/50   Left Eye Visual Acuity: Corrected Left Eye Chart Acuity: 20/70   Both Eyes Visual Acuity: Corrected Both Eyes Chart Acuity: 20/40   Able To Tolerate Visual Acuity: Yes        Assessment & Plan:   Joey Leon is a 69 year old male who presents for a Medicare Assessment.     1. Medicare annual wellness visit, subsequent    I did discuss with the patient would suggest updating blood work and need a fasting lipid panel CBC CMP PSA TSH T3-T4.  He is up-to-date with all other preventative measures he was asked to follow-up yearly for regular physical exams or for any other acute concern.          2. Encounter for annual health examination  3. Screening for iron deficiency anemia  -     CBC With Differential With Platelet  4. Screening for endocrine, metabolic and immunity disorder  -     Comp Metabolic Panel (14)  -     Lipid Panel  5.  Screening for lipoid disorders  -     Comp Metabolic Panel (14)  -     Lipid Panel  -     TSH W Reflex To Free T4  6. Screening for prostate cancer  -     PSA Total, Diagnostic  7. Benign prostatic hyperplasia without lower urinary tract symptoms  -     PSA Total, Diagnostic      The patient presented today for Medicare annual wellness visit.  During the course of today's visit the health risk assessment past medical family and social histories were updated and reviewed with the patient.  The patient was educated and counseled about appropriate screening and preventative services and these were ordered as appropriate.  Referrals for educational and counseling services were made where indicated.  Advance directives were discussed.  A copy of the recommended preventative screenings as well as discharge instructions were provided to the patient.  End-of-life planning was discussed advanced directives were also discussed and are in place.            The patient indicates understanding of these issues and agrees to the plan.  Lab work ordered.  Patient reassured.  Reinforced healthy diet, lifestyle, and exercise.      No follow-ups on file.     Carter Bowling MD, 9/5/2024     Supplementary Documentation:   General Health:  In the past six months, have you lost more than 10 pounds without trying?: 2 - No  Has your appetite been poor?: No  Type of Diet: Balanced  How does the patient maintain a good energy level?: Appropriate Exercise  How would you describe your daily physical activity?: Moderate  How would you describe your current health state?: Good  How do you maintain positive mental well-being?: Social Interaction;Puzzles;Games;Visiting Friends;Visiting Family  On a scale of 0 to 10, with 0 being no pain and 10 being severe pain, what is your pain level?: 0 - (None)  In the past six months, have you experienced urine leakage?: 0-No  At any time do you feel concerned for the safety/well-being of yourself and/or  your children, in your home or elsewhere?: No  Have you had any immunizations at another office such as Influenza, Hepatitis B, Tetanus, or Pneumococcal?: No    Health Maintenance   Topic Date Due    MA Annual Health Assessment  01/01/2024    Influenza Vaccine (1) 10/01/2024    PSA  08/24/2025    Colorectal Cancer Screening  01/29/2030    Annual Depression Screening  Completed    Fall Risk Screening (Annual)  Completed    Pneumococcal Vaccine: 65+ Years  Completed    Zoster Vaccines  Completed    COVID-19 Vaccine  Completed

## 2024-09-26 LAB
ABSOLUTE BASOPHILS: 50 CELLS/UL (ref 0–200)
ABSOLUTE EOSINOPHILS: 72 CELLS/UL (ref 15–500)
ABSOLUTE LYMPHOCYTES: 1670 CELLS/UL (ref 850–3900)
ABSOLUTE MONOCYTES: 594 CELLS/UL (ref 200–950)
ABSOLUTE NEUTROPHILS: 2115 CELLS/UL (ref 1500–7800)
ALBUMIN/GLOBULIN RATIO: 1.7 (CALC) (ref 1–2.5)
ALBUMIN: 4 G/DL (ref 3.6–5.1)
ALKALINE PHOSPHATASE: 62 U/L (ref 35–144)
ALT: 14 U/L (ref 9–46)
AST: 14 U/L (ref 10–35)
BASOPHILS: 1.1 %
BILIRUBIN, TOTAL: 0.5 MG/DL (ref 0.2–1.2)
BUN: 19 MG/DL (ref 7–25)
CALCIUM: 9.1 MG/DL (ref 8.6–10.3)
CARBON DIOXIDE: 28 MMOL/L (ref 20–32)
CHLORIDE: 108 MMOL/L (ref 98–110)
CHOL/HDLC RATIO: 2.9 (CALC)
CHOLESTEROL, TOTAL: 150 MG/DL
CREATININE: 1.09 MG/DL (ref 0.7–1.35)
EGFR: 74 ML/MIN/1.73M2
EOSINOPHILS: 1.6 %
GLOBULIN: 2.4 G/DL (CALC) (ref 1.9–3.7)
GLUCOSE: 93 MG/DL (ref 65–99)
HDL CHOLESTEROL: 52 MG/DL
HEMATOCRIT: 45.5 % (ref 38.5–50)
HEMOGLOBIN: 15 G/DL (ref 13.2–17.1)
LDL-CHOLESTEROL: 79 MG/DL (CALC)
LYMPHOCYTES: 37.1 %
MCH: 31.8 PG (ref 27–33)
MCHC: 33 G/DL (ref 32–36)
MCV: 96.6 FL (ref 80–100)
MONOCYTES: 13.2 %
MPV: 10.4 FL (ref 7.5–12.5)
NEUTROPHILS: 47 %
NON-HDL CHOLESTEROL: 98 MG/DL (CALC)
PLATELET COUNT: 192 THOUSAND/UL (ref 140–400)
POTASSIUM: 4.3 MMOL/L (ref 3.5–5.3)
PROTEIN, TOTAL: 6.4 G/DL (ref 6.1–8.1)
PSA, TOTAL: 0.52 NG/ML
RDW: 13.7 % (ref 11–15)
RED BLOOD CELL COUNT: 4.71 MILLION/UL (ref 4.2–5.8)
SODIUM: 141 MMOL/L (ref 135–146)
TRIGLYCERIDES: 103 MG/DL
TSH W/REFLEX TO FT4: 2.12 MIU/L (ref 0.4–4.5)
WHITE BLOOD CELL COUNT: 4.5 THOUSAND/UL (ref 3.8–10.8)

## 2024-11-07 ENCOUNTER — PATIENT MESSAGE (OUTPATIENT)
Dept: FAMILY MEDICINE CLINIC | Facility: CLINIC | Age: 69
End: 2024-11-07

## 2025-08-25 ENCOUNTER — OFFICE VISIT (OUTPATIENT)
Dept: FAMILY MEDICINE CLINIC | Facility: CLINIC | Age: 70
End: 2025-08-25

## 2025-08-25 VITALS
SYSTOLIC BLOOD PRESSURE: 102 MMHG | HEART RATE: 57 BPM | TEMPERATURE: 97 F | HEIGHT: 76.5 IN | RESPIRATION RATE: 18 BRPM | DIASTOLIC BLOOD PRESSURE: 56 MMHG | WEIGHT: 243 LBS | BODY MASS INDEX: 29.29 KG/M2

## 2025-08-25 DIAGNOSIS — Z12.5 SCREENING FOR PROSTATE CANCER: ICD-10-CM

## 2025-08-25 DIAGNOSIS — Z13.228 SCREENING FOR ENDOCRINE, METABOLIC AND IMMUNITY DISORDER: ICD-10-CM

## 2025-08-25 DIAGNOSIS — Z13.220 SCREENING FOR LIPOID DISORDERS: ICD-10-CM

## 2025-08-25 DIAGNOSIS — Z00.00 MEDICARE ANNUAL WELLNESS VISIT, SUBSEQUENT: ICD-10-CM

## 2025-08-25 DIAGNOSIS — Z00.00 ENCOUNTER FOR ANNUAL HEALTH EXAMINATION: ICD-10-CM

## 2025-08-25 DIAGNOSIS — Z13.29 SCREENING FOR ENDOCRINE, METABOLIC AND IMMUNITY DISORDER: ICD-10-CM

## 2025-08-25 DIAGNOSIS — Z13.0 SCREENING FOR ENDOCRINE, METABOLIC AND IMMUNITY DISORDER: ICD-10-CM
